# Patient Record
Sex: MALE | Race: BLACK OR AFRICAN AMERICAN | NOT HISPANIC OR LATINO | URBAN - METROPOLITAN AREA
[De-identification: names, ages, dates, MRNs, and addresses within clinical notes are randomized per-mention and may not be internally consistent; named-entity substitution may affect disease eponyms.]

---

## 2017-11-05 ENCOUNTER — EMERGENCY (EMERGENCY)
Facility: HOSPITAL | Age: 52
LOS: 0 days | Discharge: HOME | End: 2017-11-05
Attending: EMERGENCY MEDICINE
Payer: COMMERCIAL

## 2017-11-05 VITALS
RESPIRATION RATE: 17 BRPM | TEMPERATURE: 97 F | HEART RATE: 82 BPM | OXYGEN SATURATION: 100 % | DIASTOLIC BLOOD PRESSURE: 78 MMHG | SYSTOLIC BLOOD PRESSURE: 134 MMHG | WEIGHT: 274.92 LBS | HEIGHT: 75 IN

## 2017-11-05 PROCEDURE — 99283 EMERGENCY DEPT VISIT LOW MDM: CPT | Mod: 25

## 2017-11-05 PROCEDURE — 99053 MED SERV 10PM-8AM 24 HR FAC: CPT

## 2017-11-05 RX ORDER — IBUPROFEN 200 MG
600 TABLET ORAL ONCE
Qty: 0 | Refills: 0 | Status: COMPLETED | OUTPATIENT
Start: 2017-11-05 | End: 2017-11-05

## 2017-11-05 RX ORDER — METHOCARBAMOL 500 MG/1
2 TABLET, FILM COATED ORAL
Qty: 15 | Refills: 0 | OUTPATIENT
Start: 2017-11-05

## 2017-11-05 RX ORDER — METHOCARBAMOL 500 MG/1
750 TABLET, FILM COATED ORAL ONCE
Qty: 0 | Refills: 0 | Status: COMPLETED | OUTPATIENT
Start: 2017-11-05 | End: 2017-11-05

## 2017-11-05 RX ADMIN — Medication 600 MILLIGRAM(S): at 03:12

## 2017-11-05 RX ADMIN — Medication 600 MILLIGRAM(S): at 03:25

## 2017-11-05 RX ADMIN — METHOCARBAMOL 750 MILLIGRAM(S): 500 TABLET, FILM COATED ORAL at 03:12

## 2017-11-05 NOTE — ED PROVIDER NOTE - ADDITIONAL HPI
PMHx:  no pertinent Pmhx except mentioned in HPI  PSHx: no pertinent Pshx except mentioned in  HPI  FHx:  no pertinent Fhx except mentioned in  HPI  Soc Hx: unkonwn

## 2017-11-05 NOTE — ED PROVIDER NOTE - MEDICAL DECISION MAKING DETAILS
pt appears well, pt appears well and non-toxic. . VSS. Sx have improved during ED stay. No acute events during ED observation period. Precautions given to return to the ED if sx persist or change. Pt expresses understanding and has no further questions. Pt feels comfortable and wishes to be discharged home. Instructed to follow up with PMD in 24 hrs.
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
## 2017-11-05 NOTE — ED PROVIDER NOTE - SECONDARY DIAGNOSIS.
Bilateral low back pain, unspecified chronicity, with sciatica presence unspecified Strain of neck muscle, initial encounter

## 2017-11-05 NOTE — ED PROVIDER NOTE - PHYSICAL EXAMINATION
GEN: Alert, NAD  HEAD: atraumatic, EOMI, PERRL, normal lids/conjunctiva  ENT: normal hearing, patent oropharynx without erythema/exudate, uvula midline  NECK: +supple, no tenderness/meningismus, +Trachea midline  PULM: Bilateral BS, normal resp effort, no wheeze/stridor/retractions  CV: RRR, no M/R/G, +dist ext pulses  ABD: soft, NT/ND  BACK: no CVAT, no midline tenderness  MSK: no edema/erythema/cyanosis  SKIN: no rash  NEURO: AAOx3, no sensory/motor deficits, CN 2-12 intact  diffuse paraspinal ttp over neck and lower back, no midline tenderness, FROM at the neck, no truncal ecchymosis, no crepitus at the chest, FROM of b/l shoulders, no knee pain
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
## 2017-11-06 DIAGNOSIS — M54.41 LUMBAGO WITH SCIATICA, RIGHT SIDE: ICD-10-CM
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
## 2017-11-06 DIAGNOSIS — M54.5 LOW BACK PAIN: ICD-10-CM

## 2017-11-06 DIAGNOSIS — S16.1XXA STRAIN OF MUSCLE, FASCIA AND TENDON AT NECK LEVEL, INITIAL ENCOUNTER: ICD-10-CM

## 2017-11-06 DIAGNOSIS — M54.42 LUMBAGO WITH SCIATICA, LEFT SIDE: ICD-10-CM

## 2017-11-06 DIAGNOSIS — M54.2 CERVICALGIA: ICD-10-CM
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
## 2025-04-23 NOTE — ED ADULT NURSE NOTE - SEVERITY
Name: Adam Stone      : 1949      MRN: [id number]  Encounter Provider: Marco Antonio Grove MD  Encounter Date: 2025   Encounter department: [facility]  :  Assessment & Plan  Type 2 diabetes mellitus with stage 4 chronic kidney disease, with long-term current use of insulin (HCC)  Patient is stable with current meds and discussed a low carb diet. Pt  recommended to see eye doctor and foot doctor for routine screening as per protocol. Recheck A1C  and Cr in 3 months. Patient questions answered today about this condtion.   Lab Results   Component Value Date    HGBA1C 5.0 2025     Orders:  •  Albumin / creatinine urine ratio; Future  •  POCT hemoglobin A1c    Cardiomyopathy, unspecified type (HCC)  Patient is stable  and will continue present plan of care and reassess at next routine visit. All questions about this problem from patient were answered today.        Chronic obstructive pulmonary disease, unspecified COPD type (HCC)  Continue with current inhalation therapy to maximize lung function. AVOID ALL TOBACCO USAGE and please obtain flu vaccine  yearly.        Recurrent major depressive disorder, in remission (HCC)  Patient to continue utilizing medical therapy as well and counseling sources as applicable for condition. If  suicidal thought or fear of suicide to contact 911 and seek help immediately. Meds reviewed and patient questions answered today        Mixed hyperlipidemia  Patient  is stable with current medication and we discussed a low fat low cholesterol diet. Weight loss also discussed for this will help lower cholesterol also. Recheck lipids in 6 months.        Malignant neoplasm of lower lobe of left lung (HCC)  Patient is stable  and will continue present plan of care and reassess at next routine visit. All questions about this problem from patient were answered today.        Obesity, morbid (HCC)  Patient to increase exercise and partake of a diet  with less calories to promote  weight loss        Personal history of prostate cancer  Patient is stable  and will continue present plan of care and reassess at next routine visit. All questions about this problem from patient were answered today.        Other fatigue    Orders:  •  TSH, 3rd generation with Free T4 reflex; Future  •  Comprehensive metabolic panel; Future          Falls Plan of Care: balance, strength, and gait training instructions were provided. Recommended assistive device to help with gait and balance.     History of Present Illness   75-year-old male here today for checkup for multim[facility] patient with type 2 diabetes history of prostate cancer as well as lung cancer and his biggest problem that he is his fatigue.  Patient getting a sleep history will sleep in his recliner sometimes will go to bed till 4 AM and he is tired most of the day.  He does have some sleep apnea and will wear his mask but his mask is wearing at all different times of the day due to his different sleep schedule.  This patient will get some lab work to make sure his thyroid is in order rest of his lab work he had recently done in the ER for vertigo with okay.  Patient will need to get a start with some good sleep hygiene first going to bed at a regular time and sleeping in the bed and says as opposed to sleeping in recliner.      Review of Systems   Constitutional:  Positive for fatigue. Negative for activity change, appetite change, chills, fever and unexpected weight change.   HENT:  Negative for congestion, ear pain, hearing loss, mouth sores, postnasal drip, sinus pressure, sinus pain, sneezing and sore throat.    Respiratory:  Negative for apnea, cough, shortness of breath and wheezing.    Cardiovascular:  Negative for chest pain, palpitations and leg swelling.   Gastrointestinal:  Negative for abdominal pain, constipation, diarrhea, nausea and vomiting.   Endocrine: Negative for cold intolerance and heat  "intolerance.   Genitourinary:  Negative for dysuria, frequency and hematuria.   Musculoskeletal:  Negative for arthralgias, back pain, gait problem, joint swelling and neck pain.   Skin:  Negative for rash.   Neurological:  Positive for weakness. Negative for dizziness and numbness.   Hematological:  Does not bruise/bleed easily.   Psychiatric/Behavioral:  Negative for agitation, behavioral problems, confusion, hallucinations and sleep disturbance. The patient is not nervous/anxious.        Objective   /74 (BP Location: Left arm, Patient Position: Sitting, Cuff Size: Large)   Pulse 96   Temp 99.1 °F (37.3 °C) (Skin)   Ht 5' 7.5\" (1.715 m)   Wt 102 kg (224 lb)   SpO2 96%   BMI 34.57 kg/m²      Physical Exam  Constitutional:       Appearance: He is well-developed. He is obese.   HENT:      Head: Normocephalic and atraumatic.      Right Ear: External ear normal.      Left Ear: External ear normal.      Nose: Nose normal.      Mouth/Throat:      Pharynx: Oropharynx is clear. No oropharyngeal exudate.   Eyes:      General: No scleral icterus.     Conjunctiva/sclera: Conjunctivae normal.      Pupils: Pupils are equal, round, and reactive to light.   Cardiovascular:      Rate and Rhythm: Normal rate and regular rhythm.      Pulses: no weak pulses.           Dorsalis pedis pulses are 2+ on the right side and 2+ on the left side.        Posterior tibial pulses are 2+ on the right side and 2+ on the left side.      Heart sounds: Normal heart sounds.   Pulmonary:      Effort: Pulmonary effort is normal.      Breath sounds: Normal breath sounds. No wheezing or rales.   Abdominal:      General: Bowel sounds are normal.      Palpations: Abdomen is soft.      Tenderness: There is no abdominal tenderness. There is no guarding.   Musculoskeletal:         General: Normal range of motion.      Cervical back: Normal range of motion and neck supple.   Feet:      Right foot:      Skin integrity: Callus and dry skin present. " No ulcer, skin breakdown, erythema or warmth.      Left foot:      Skin integrity: Callus and dry skin present. No ulcer, skin breakdown, erythema or warmth.   Skin:     General: Skin is warm and dry.      Findings: No erythema or rash.   Neurological:      Mental Status: He is alert and oriented to person, place, and time. Mental status is at baseline.   Psychiatric:         Mood and Affect: Mood normal.         Behavior: Behavior normal.         Thought Content: Thought content normal.         Judgment: Judgment normal.     Patient's shoes and socks removed.    Right Foot/Ankle   Right Foot Inspection  Skin Exam: skin normal, dry skin, callus and callus. Skin not intact, no warmth, no erythema, no maceration, no abnormal color, no pre-ulcer and no ulcer.     Toe Exam: ROM and strength within normal limits. No tenderness    Sensory   Vibration: intact  Proprioception: intact  Monofilament testing: intact    Vascular  Capillary refills: < 3 seconds  The right DP pulse is 2+. The right PT pulse is 2+.     Left Foot/Ankle  Left Foot Inspection  Skin Exam: skin normal, dry skin and callus. Skin not intact, no warmth, no erythema, no maceration, normal color, no pre-ulcer and no ulcer.     Toe Exam: No swelling and no tenderness.     Sensory   Vibration: intact  Proprioception: intact  Monofilament testing: intact    Vascular  Capillary refills: < 3 seconds  The left DP pulse is 2+. The left PT pulse is 2+.     Assign Risk Category  No deformity present  No loss of protective sensation  No weak pulses  Risk: 0        PAIN SCALE 7 OF 10.

## 2025-06-26 ENCOUNTER — APPOINTMENT (OUTPATIENT)
Dept: GASTROENTEROLOGY | Facility: CLINIC | Age: 60
End: 2025-06-26